# Patient Record
(demographics unavailable — no encounter records)

---

## 2025-01-31 NOTE — DISCUSSION/SUMMARY
[FreeTextEntry1] : 29-year-old female with right thumb pain consistent with trigger thumb.  We discussed that her IP joint pain is probably from stiffness to the thumb due to trigger thumb.  We discussed etiology of trigger thumb the conservative management which included a steroid injection however she deferred today.  She was interested in going to physical therapy I was happy to provide a prescription for this.  I discussed following up with me if she continues to have persistent pain.   The anatomy and physiology of trigger finger was discussed with the patient today in the office.  Edema and increased contact pressure within the flexor tendons at the A1 pulley can cause pain, crepitation, and limitation of function.  Treatment options include oral anti-inflammatory medications, up to 2 local steroid injections, or surgical release.  While majority of patients do respond to conservative treatment, up to 20% may require surgical release.

## 2025-01-31 NOTE — HISTORY OF PRESENT ILLNESS
[FreeTextEntry1] : Left thumb pian  This is a very pleasant 29-year-old female who is presenting to me for left thumb pain starting last year in October.  She reports no trauma or paresthesias.  No past medical history and takes no medication.  She reports that the pain is mostly localized to her thumb IP joint and she is not able to fully extend the thumb as she is to the contralateral hand.  She works currently as a teacher.  Denies any catching or locking.

## 2025-01-31 NOTE — PHYSICAL EXAM
[de-identified] : Left thumb pain no swelling or deformity  TTP A1 pulley, no catching or locking TTP dorsum of IP joint Cannot extend finger as contralateral thumb however EPL and FPL intact  Pain with passive extension  [de-identified] :   Xray with 3 views of the left thumb was done in office today, 1/31/25 , and reviewed by Dr. Bocanegra, demonstrated no milly abnormalities, no arthritis